# Patient Record
Sex: FEMALE | Race: WHITE | URBAN - METROPOLITAN AREA
[De-identification: names, ages, dates, MRNs, and addresses within clinical notes are randomized per-mention and may not be internally consistent; named-entity substitution may affect disease eponyms.]

---

## 2017-07-11 ENCOUNTER — HISTORICAL (OUTPATIENT)
Dept: OCCUPATIONAL THERAPY | Facility: HOSPITAL | Age: 57
End: 2017-07-11

## 2017-07-25 ENCOUNTER — HISTORICAL (OUTPATIENT)
Dept: OCCUPATIONAL THERAPY | Facility: HOSPITAL | Age: 57
End: 2017-07-25

## 2017-07-27 ENCOUNTER — HISTORICAL (OUTPATIENT)
Dept: OCCUPATIONAL THERAPY | Facility: HOSPITAL | Age: 57
End: 2017-07-27

## 2017-07-31 ENCOUNTER — HISTORICAL (OUTPATIENT)
Dept: OCCUPATIONAL THERAPY | Facility: HOSPITAL | Age: 57
End: 2017-07-31

## 2017-09-13 ENCOUNTER — HISTORICAL (OUTPATIENT)
Dept: ADMINISTRATIVE | Facility: HOSPITAL | Age: 57
End: 2017-09-13

## 2017-09-13 LAB
ABS NEUT (OLG): 3.73 X10(3)/MCL (ref 2.1–9.2)
ALBUMIN SERPL-MCNC: 4 GM/DL (ref 3.4–5)
ALBUMIN/GLOB SERPL: 1.2 {RATIO}
ALP SERPL-CCNC: 128 UNIT/L (ref 38–126)
ALT SERPL-CCNC: 19 UNIT/L (ref 12–78)
APTT PPP: 30.4 SECOND(S) (ref 20.6–36)
AST SERPL-CCNC: 15 UNIT/L (ref 15–37)
BASOPHILS # BLD AUTO: 0 X10(3)/MCL (ref 0–0.2)
BASOPHILS NFR BLD AUTO: 0 %
BILIRUB SERPL-MCNC: 0.4 MG/DL (ref 0.2–1)
BILIRUBIN DIRECT+TOT PNL SERPL-MCNC: 0.1 MG/DL (ref 0–0.2)
BILIRUBIN DIRECT+TOT PNL SERPL-MCNC: 0.3 MG/DL (ref 0–0.8)
BUN SERPL-MCNC: 13 MG/DL (ref 7–18)
CALCIUM SERPL-MCNC: 9.1 MG/DL (ref 8.5–10.1)
CHLORIDE SERPL-SCNC: 105 MMOL/L (ref 98–107)
CO2 SERPL-SCNC: 28 MMOL/L (ref 21–32)
CREAT SERPL-MCNC: 0.88 MG/DL (ref 0.55–1.02)
EOSINOPHIL # BLD AUTO: 0.1 X10(3)/MCL (ref 0–0.9)
EOSINOPHIL NFR BLD AUTO: 2 %
ERYTHROCYTE [DISTWIDTH] IN BLOOD BY AUTOMATED COUNT: 13.7 % (ref 11.5–17)
GLOBULIN SER-MCNC: 3.3 GM/DL (ref 2.4–3.5)
GLUCOSE SERPL-MCNC: 82 MG/DL (ref 74–106)
HCT VFR BLD AUTO: 41.1 % (ref 37–47)
HGB BLD-MCNC: 13.7 GM/DL (ref 12–16)
INR PPP: 1.06 (ref 0–1.27)
LYMPHOCYTES # BLD AUTO: 1.3 X10(3)/MCL (ref 0.6–4.6)
LYMPHOCYTES NFR BLD AUTO: 23 %
MCH RBC QN AUTO: 28.1 PG (ref 27–31)
MCHC RBC AUTO-ENTMCNC: 33.3 GM/DL (ref 33–36)
MCV RBC AUTO: 84.2 FL (ref 80–94)
MONOCYTES # BLD AUTO: 0.5 X10(3)/MCL (ref 0.1–1.3)
MONOCYTES NFR BLD AUTO: 9 %
NEUTROPHILS # BLD AUTO: 3.73 X10(3)/MCL (ref 1.4–7.9)
NEUTROPHILS NFR BLD AUTO: 65 %
PLATELET # BLD AUTO: 186 X10(3)/MCL (ref 130–400)
PMV BLD AUTO: 10.2 FL (ref 9.4–12.4)
POTASSIUM SERPL-SCNC: 3.9 MMOL/L (ref 3.5–5.1)
PROT SERPL-MCNC: 7.3 GM/DL (ref 6.4–8.2)
PROTHROMBIN TIME: 13.6 SECOND(S) (ref 12.1–14.2)
RBC # BLD AUTO: 4.88 X10(6)/MCL (ref 4.2–5.4)
SODIUM SERPL-SCNC: 140 MMOL/L (ref 136–145)
WBC # SPEC AUTO: 5.7 X10(3)/MCL (ref 4.5–11.5)

## 2020-05-12 ENCOUNTER — HOSPITAL ENCOUNTER (OUTPATIENT)
Dept: INTENSIVE CARE | Facility: HOSPITAL | Age: 60
End: 2020-05-13
Attending: INTERNAL MEDICINE | Admitting: INTERNAL MEDICINE

## 2022-04-09 ENCOUNTER — HISTORICAL (OUTPATIENT)
Dept: ADMINISTRATIVE | Facility: HOSPITAL | Age: 62
End: 2022-04-09

## 2022-04-25 VITALS
HEIGHT: 68 IN | DIASTOLIC BLOOD PRESSURE: 69 MMHG | WEIGHT: 155 LBS | BODY MASS INDEX: 23.49 KG/M2 | SYSTOLIC BLOOD PRESSURE: 125 MMHG
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
## 2022-05-02 NOTE — HISTORICAL [facility]
This is a historical note converted from Chandrika. Formatting and pictures may have been removed.  Please reference Chandrika for original formatting and attached multimedia. Chief Complaint  c/o left side weakness/numbness and blurry vision started yesterday. weakness and numbness resolved, still having blurry vision. hx TIAs, FAST negative  Reason for Consultation  TIA/CVA?work-up  History of Present Illness  60-year-old female?with past medical history of depression,?chronic back pain,?CVA (2014, no deficits),?presented to ED?on 5/12 with complaints of?blurred vision?and left-sided weakness/numbness.? She stated that symptoms began yesterday morning around?11 AM?while she was working in her garden.? She then began sweating profusely and?developed?left-sided weakness?with left-sided headache.? She took a 3-hour?nap, but continued to have blurry vision?after her nap.? She reports the blurry vision?as mirage-like or haziness in both eyes.? CT head showed no acute cortical infarct, hemorrhage, or mass lesion.? She will be admitted to hospitalist?and neurology was consulted?for?TIA/stroke.  Review of Systems  Except as documented, all other systems reviewed and are negative  Physical Exam  Vitals & Measurements  T:?37.0? ?C (Oral)? HR:?74(Peripheral)? HR:?74(Monitored)? RR:?21? BP:?152/97? SpO2:?97%? WT:?72.5?kg?  GENERAL: NAD, calm, cooperative, appropriate  HEART: RRR (SR on telemetry),? no LE edema  MENTAL STATUS: Alert,?oriented x4, follows commands reliably  SPEECH/LANGUAGE: Clear, fluent, coherent  CN:  perr, EOMI, VFF, gaze conjugate  No tactile or motor facial asymmetry  Motor: Muscle strength 5/5 BUE, BLE, No focal weakness  Cerebellar: No tremor or dysmetria  Sensory: Normal to tactile stim.  Gait: not observed  Memory: normal and thought process intact  SKIN: warm, dry, intact?  Assessment/Plan  Impression and plan  Blurry vision....?CVA work-up  Chronic pain syndrome  Depression  h/o CVA (2014)  ?  Stroke  workup in progress  -CTH:?No acute cortical infarct, hemorrhage, or mass lesion  -MRI, CTA,?ECHO, CUS, lipid panel, A1c: ordered  ?   ASA naïve, not on statin therapy at home  prn Hydralazine and Labetalol for SBP >220 or DBP >120  Bedrest and HOB flat for 24 hours  NPO until passes [facility]  PT/OT/ST to evaluate after 24 hour bedrest completed  ?  ?   Further recommendations may follow by MD.?   Problem List/Past Medical History  Ongoing  Cervicalgia(  Confirmed  )  Chronic back pain  Closed displaced fracture of left femoral neck  Depression  Hematuria(  Confirmed  )  Left hip pain  Lumbago(  Confirmed  )  Historical  Cholelithiasis NOS(  Confirmed  )  Procedure/Surgical History  Biopsy Gastrointestinal (09/13/2017)  Endoscopic Ultrasonography (Upper) (09/13/2017)  Esophagogastroduodenoscopy (09/13/2017)  Esophagogastroduodenoscopy, flexible, transoral; with biopsy, single or multiple (09/13/2017)  Esophagogastroduodenoscopy, flexible, transoral; with endoscopic ultrasound examination, including the esophagus, stomach, and either the duodenum or a surgically altered stomach where the jejunum is examined distal to the anastomosis (09/13/2017)  Excision of Stomach, Via Natural or Artificial Opening Endoscopic, Diagnostic (09/13/2017)  Inspection of Upper Intestinal Tract, Via Natural or Artificial Opening Endoscopic (09/13/2017)  Cystoscopy (None) (08/19/2014)  Cystourethroscopy (separate procedure). (08/19/2014)  Other cystoscopy (08/19/2014)  percutaneous pinning Lt. hip @ [facility] (06/18/2013)  back surg. (06/17/2013)  Deviated septum (2013)  COLONOSCOPY  EGD  History of hip surgery  Previous back surgery  Revision rhinoplasty   Medications  Inpatient  aspirin 325 mg oral tablet, 325 mg= 1 tab(s), Oral, Daily  atorvastatin, 40 mg= 1 tab(s), Oral, Daily  Cipro 500 mg oral tablet, 500 mg= 1 tab(s), Oral, Once  hydrALAZINE 20 mg/mL injectable solution, 10 mg= 0.5 mL, IV Push, q4hr, PRN  labetalol, 10 mg= 2 mL, IV  Push, q10min, PRN  lidocaine topical 2% gel with applicator, 5 belen(s), TOP, Once  NS (0.9% Sodium Chloride) Infusion 1,000 mL, 1000 mL, IV  Home  Neurontin 300 mg oral capsule, 300 mg= 1 cap(s), Oral, TID, 3 refills  oxyCODONE 20 mg oral tablet, 20 mg= 1 tab(s), Oral, q6hr,? ?Not taking: Last Dose Date/Time Unknown  oxyCODONE 30 mg oral tablet, 30 mg= 1 tab(s), Oral, q6hr  promethazine 25 mg oral tablet, 25 mg= 1 tab(s), Oral, Once a day (at bedtime)  Template Non-Formulary Med, See Instructions,? ?Investigating: Last Dose Date/Time Unknown  Zanaflex 6 mg oral capsule, 6 mg= 1 cap(s), Oral, QID, 3 refills,? ?Investigating: Last Dose Date/Time Unknown  Zoloft 100 mg oral tablet, 100 mg= 1 tab(s), Oral, Daily  Allergies  No Known Allergies  No Known Medication Allergies  Social History  Alcohol - Denies Alcohol Use, 08/19/2014  Never, 11/17/2016  Never, 08/09/2016  Employment/School  UNABLE TO WORK, Work/School description: UNABLE TO WORK. Previous employment/school: Completed 11th grade. Operates hazardous equipment: No., 01/24/2017  Exercise - Does not exercise, 06/30/2015  Self assessment: Poor condition., 06/30/2015  Home/Environment  Lives with Alone. Living situation: Home/Independent. Home equipment: Walker/Cane. Alcohol abuse in household: No. Substance abuse in household: No. Smoker in household: No. Injuries/Abuse/Neglect in household: No. Feels unsafe at home: No. Safe place to go: Yes. Family/Friends available for support: Yes. Concern for family members at home: No. Major illness in household: No. Financial concerns: No. TV/Computer concerns: No. Risks in environment: Pets/Animal exposure., 01/24/2017  Nutrition/Health  Low Salt, Wants to lose weight: No. Sleeping concerns: Yes. Feels highly stressed: Yes., 08/09/2016  Substance Use - Denies Substance Abuse, 08/19/2014  Never, 11/17/2016  Never, 08/09/2016  Tobacco - Denies Tobacco Use, 08/19/2014  Never smoker, 11/16/2016  Never smoker,  08/09/2016  Family History  Diabetes mellitus type 2: Mother and Mother.  Primary malignant neoplasm of colon: Mother and Father.  Lab Results  Labs Last 24 Hours?  ?Chemistry? Hematology/Coagulation?   Sodium Lvl: 143 mmol/L (05/12/20 14:05:00) PT: 12.5 second(s) (05/12/20 14:05:00)   Potassium Lvl: 4.1 mmol/L (05/12/20 14:05:00) INR: 1 (05/12/20 14:05:00)   Chloride: 107 mmol/L (05/12/20 14:05:00) PTT: 29.4 second(s) (05/12/20 14:05:00)   CO2: 26 mmol/L (05/12/20 14:05:00) WBC: 5.3 x10(3)/mcL (05/12/20 14:05:00)   Calcium Lvl: 9 mg/dL (05/12/20 14:05:00) RBC: 4.63 x10(6)/mcL (05/12/20 14:05:00)   Glucose Lvl:?79 mg/dL?Low (05/12/20 14:05:00) Hgb: 13.1 gm/dL (05/12/20 14:05:00)   BUN: 10.3 mg/dL (05/12/20 14:05:00) Hct: 42.3 % (05/12/20 14:05:00)   Creatinine: 0.84 mg/dL (05/12/20 14:05:00) Platelet: 179 x10(3)/mcL (05/12/20 14:05:00)   eGFR-AA: >60 (05/12/20 14:05:00) MCV: 91.4 fL (05/12/20 14:05:00)   eGFR-MIREYA: >60 (05/12/20 14:05:00) MCH: 28.3 pg (05/12/20 14:05:00)   Bili Total: 0.3 mg/dL (05/12/20 14:05:00) MCHC:?31 gm/dL?Low (05/12/20 14:05:00)   Bili Direct: 0.1 mg/dL (05/12/20 14:05:00) RDW: 12.4 % (05/12/20 14:05:00)   Bili Indirect: 0.2 mg/dL (05/12/20 14:05:00) MPV: 10.2 fL (05/12/20 14:05:00)   AST: 24 unit/L (05/12/20 14:05:00) Abs Neut: 2.88 x10(3)/mcL (05/12/20 14:05:00)   ALT: 14 unit/L (05/12/20 14:05:00) Neutro Auto: 54 % (05/12/20 14:05:00)   Alk Phos: 61 unit/L (05/12/20 14:05:00) Lymph Auto: 32 % (05/12/20 14:05:00)   Total Protein: 6.9 gm/dL (05/12/20 14:05:00) Mono Auto: 7 % (05/12/20 14:05:00)   Albumin Lvl: 3.9 gm/dL (05/12/20 14:05:00) Eos Auto: 6 % (05/12/20 14:05:00)   Globulin: 3 gm/dL (05/12/20 14:05:00) Abs Eos: 0.3 x10(3)/mcL (05/12/20 14:05:00)   A/G Ratio: 1.3 ratio (05/12/20 14:05:00) Basophil Auto: 1 % (05/12/20 14:05:00)   Total CK:?177 U/L?High (05/12/20 14:05:00) Abs Neutro: 2.88 x10(3)/mcL (05/12/20 14:05:00)   TSH: 2.7294 uIU/mL (05/12/20 14:05:00) Abs Lymph: 1.7 x10(3)/mcL  (05/12/20 14:05:00)    Abs Mono: 0.4 x10(3)/mcL (05/12/20 14:05:00)    Abs Baso: 0 x10(3)/mcL (05/12/20 14:05:00)   Diagnostic Results  (05/12/2020 14:28 CDT CT Head W/O Contrast)  Reason For Exam  TIA, initial exam;Other (please specify)  ?  Radiology Report  Findings:  ?  There is no acute cortical infarct, hemorrhage or mass lesion. Minimal  patchy hypoattenuation the cerebral white matter is nonspecific but  most commonly associated with chronic small vessel ischemic changes.  The ventricles are not significantly enlarged.  ?  Visualized paranasal sinuses and mastoid air cells are clear.  ?  Impression:  No acute cortical infarct, hemorrhage or mass lesion.      pt seen and examined in conjunction with NP  dw ICU MD F to F  neuro exam comments/ see also above NP note:  neuro intact  ?   imaging: images and report(s) reviewed  CTh ok  mri 2014 minimal chr isch ch/wmhs  ?   Assessment/Plan/Recs:  as above with the following addn comments (if any):  1. await MRI  dw MRI tech  aim to reassess tomorrow  ?  ?

## 2022-05-02 NOTE — HISTORICAL [facility]
This is a historical note converted from Cerlatricia. Formatting and pictures may have been removed.  Please reference Cerlatricia for original formatting and attached multimedia. Chief Complaint  Left-sided weakness?and numbness  History of Present Illness  PCP: Lucila Mena MD  CODE STATUS: Full  ?  Ms. Feliz is a 60-year-old female with a history of chronic arthralgia back pain opioid dependent as well as CVA without residual deficits.? She presented emergency department due to sudden onset of left-sided weakness and paresthesias with associated headache and blurred vision.? Patient reports that yesterday evening while she was in her garden she suddenly became weak on her left side and noted some numbness/tingling.? She took a nap and later when she woke up her symptoms resolved.? However, she later developed headache and blurriness in her vision.? On arrival CT head did not show any acute intracranial abnormalities.? She had no focal deficits on initial exam.? Laboratory work-up was unremarkable.? Hospital medicine was consulted for admission for CVA work-up.  Review of Systems  Except as documented all systems reviewed and negative  Physical Exam  Vitals & Measurements  T:?37.0? ?C (Oral)? HR:?64(Peripheral)? HR:?64(Monitored)? RR:?19? BP:?152/97? SpO2:?99%? WT:?72.5?kg?  General:?NAD, nontoxic appearing  Eye: PERRLA, clear conjunctiva  HENT:?moist mucus membranes, normocephalic  Neck: full range of motion, no lymphadenopathy  Respiratory:?clear to auscultation bilaterally, no wheezes rales or rhonchi  Cardiovascular:?regular rate and rhythm without murmurs, gallops or rubs, no carotid bruits  Gastrointestinal:?soft, non-tender, non-distended, active bowel sounds  Genitourinary: no CVA tenderness to palpation  Musculoskeletal:?full range of motion of all extremities  Integumentary: no rashes or skin lesions present  Neurologic: cranial nerves intact, 5 out of 5?upper extremity strength?bilaterally,?4-5?left lower  extremity, no dysarthria or aphasia, no visual field deficits,?no sensory deficits  ?   Labs?(Last four charted values)  WBC ?????5.3???(MAY 12)  Hgb ?????13.1???(MAY 12)  Hct ?????42.3???(MAY 12)  Plt ?????179???(MAY 12)  Na ?????143???(MAY 12)  K ?????4.1???(MAY 12)  CO2 ?????26???(MAY 12)  Cl ?????107???(MAY 12)  Cr ?????0.84???(MAY 12)  BUN ?????10.3???(MAY 12)  Glucose Random ???????L?79???(MAY 12)  PT ?????12.5???(MAY 12)  INR ?????1.0???(MAY 12)  PTT ?????29.4???(MAY 12)  ?   Accession:?[id number]  Order:?CT Head W/O Contrast  Report Dt/Tm:?05/12/2020 14:30  Report:?  ?  Clinical History:  Left-sided weakness and numbness  ?  Reference:  None Available.  ?  Technique:  CT imaging of the head performed from the skull base to the vertex  without intravenous contrast.  mGycm. Automatic exposure  control, adjustment of mA/kV or iterative reconstruction technique was  used to reduce radiation.  ?  Findings:  ?  There is no acute cortical infarct, hemorrhage or mass lesion. Minimal  patchy hypoattenuation the cerebral white matter is nonspecific but  most commonly associated with chronic small vessel ischemic changes.  The ventricles are not significantly enlarged.  ?  Visualized paranasal sinuses and mastoid air cells are clear.  ?  Impression:  No acute cortical infarct, hemorrhage or mass lesion.  Assessment/Plan  Transient ischemic attack  Rule out CVA  Blurred vision  Left-sided weakness/paresthesias-resolved  History of CVA without residual deficits  Chronic back pain  Opioid dependence  Depression  ?  Plan:  ?  Was not a candidate for TPA  CT?head without any acute abnormalities  We will proceed with CVA protocol  MRI brain,?CTA head neck,?carotid ultrasound, echocardiogram  Start full dose aspirin?and high intensity statin  Allow for permissive hypertension  PT/OT/SLP eval  A1c lipid panel  Consult to neurology  ?  I, Dimitris Pop PA-C, have seen and discussed this patients case with ?Pop  MD  Please see addendum section and the rest of the note for further information on patient assessment and treatment  ?   i li Perezd care of this patient at 5.10 pm  For this patient encounter I reviewed NPs documentation, treatment plan and medical decision making. ?I had a face-to-face time with this pt  please see addendum below  60-year-old female presented to the ER with complaints of left-sided weakness numbness and blurred vision apparently patient was working outside in her garden and when she came inside she started sweating profusely and noticed left-sided weakness and numbness. ?She took a nap and then she worked up still had blurred vision today everything is almost back to normal. ?CT of the head without contrast ordered and negative  ?  General alert awake oriented  Chest clear to auscultate  Abdomen soft nontender  neuro : sensory intact  motor :5/5 in b/l upper extremity and  4-5 /5 in LLE  ?  Rule out CVA  Chronic pain syndrome  Depression  ?  We will initiate stroke protocol  MRI carotid ultrasound 2D echo  PT OT ST  We will start on aspirin and statin if patient passes swallowing  ?pt is aspirin naive  neuro consult  npo until pt passes swallowing  ?  ?  prophylaxis : scd   Problem List/Past Medical History  Ongoing  Cervicalgia(  Confirmed  )  Chronic back pain  Closed displaced fracture of left femoral neck  Depression  Hematuria(  Confirmed  )  Left hip pain  Lumbago(  Confirmed  )  Historical  Cholelithiasis NOS(  Confirmed  )  Procedure/Surgical History  Biopsy Gastrointestinal (09/13/2017)  Endoscopic Ultrasonography (Upper) (09/13/2017)  Esophagogastroduodenoscopy (09/13/2017)  Esophagogastroduodenoscopy, flexible, transoral; with biopsy, single or multiple (09/13/2017)  Esophagogastroduodenoscopy, flexible, transoral; with endoscopic ultrasound examination, including the esophagus, stomach, and either the duodenum or a surgically altered stomach where the jejunum is  examined distal to the anastomosis (09/13/2017)  Excision of Stomach, Via Natural or Artificial Opening Endoscopic, Diagnostic (09/13/2017)  Inspection of Upper Intestinal Tract, Via Natural or Artificial Opening Endoscopic (09/13/2017)  Cystoscopy (None) (08/19/2014)  Cystourethroscopy (separate procedure). (08/19/2014)  Other cystoscopy (08/19/2014)  percutaneous pinning Lt. hip @ [facility] (06/18/2013)  back surg. (06/17/2013)  Deviated septum (2013)  COLONOSCOPY  EGD  History of hip surgery  Previous back surgery  Revision rhinoplasty   Medications  Inpatient  aspirin 325 mg oral tablet, 325 mg= 1 tab(s), Oral, Daily  atorvastatin, 40 mg= 1 tab(s), Oral, Daily  Cipro 500 mg oral tablet, 500 mg= 1 tab(s), Oral, Once  hydrALAZINE 20 mg/mL injectable solution, 10 mg= 0.5 mL, IV Push, q4hr, PRN  labetalol, 10 mg= 2 mL, IV Push, q10min, PRN  lidocaine topical 2% gel with applicator, 5 belen(s), TOP, Once  NS (0.9% Sodium Chloride) Infusion 1,000 mL, 1000 mL, IV  Home  Neurontin 300 mg oral capsule, 300 mg= 1 cap(s), Oral, TID, 3 refills  oxyCODONE 20 mg oral tablet, 20 mg= 1 tab(s), Oral, q6hr,? ?Not taking: Last Dose Date/Time Unknown  oxyCODONE 30 mg oral tablet, 30 mg= 1 tab(s), Oral, q6hr  promethazine 25 mg oral tablet, 25 mg= 1 tab(s), Oral, Once a day (at bedtime)  Template Non-Formulary Med, See Instructions,? ?Investigating: Last Dose Date/Time Unknown  Zanaflex 6 mg oral capsule, 6 mg= 1 cap(s), Oral, QID, 3 refills,? ?Investigating: Last Dose Date/Time Unknown  Zoloft 100 mg oral tablet, 100 mg= 1 tab(s), Oral, Daily  Allergies  No Known Allergies  No Known Medication Allergies  Social History  Alcohol - Denies Alcohol Use, 08/19/2014  Never, 11/17/2016  Never, 08/09/2016  Employment/School  UNABLE TO WORK, Work/School description: UNABLE TO WORK. Previous employment/school: Completed 11th grade. Operates hazardous equipment: No., 01/24/2017  Exercise - Does not exercise, 06/30/2015  Self assessment: Poor  condition., 06/30/2015  Home/Environment  Lives with Alone. Living situation: Home/Independent. Home equipment: Walker/Cane. Alcohol abuse in household: No. Substance abuse in household: No. Smoker in household: No. Injuries/Abuse/Neglect in household: No. Feels unsafe at home: No. Safe place to go: Yes. Family/Friends available for support: Yes. Concern for family members at home: No. Major illness in household: No. Financial concerns: No. TV/Computer concerns: No. Risks in environment: Pets/Animal exposure., 01/24/2017  Nutrition/Health  Low Salt, Wants to lose weight: No. Sleeping concerns: Yes. Feels highly stressed: Yes., 08/09/2016  Substance Use - Denies Substance Abuse, 08/19/2014  Never, 11/17/2016  Never, 08/09/2016  Tobacco - Denies Tobacco Use, 08/19/2014  Never smoker, 11/16/2016  Never smoker, 08/09/2016  Family History  Diabetes mellitus type 2: Mother and Mother.  Primary malignant neoplasm of colon: Mother and Father.

## 2022-05-02 NOTE — HISTORICAL [facility]
This is a historical note converted from Cerlatricia. Formatting and pictures may have been removed.  Please reference Cerner for original formatting and attached multimedia. Admit and Discharge Dates  Admit Date: 05/12/2020  Discharge Date: 05/13/2020  Physicians  Attending Physician - Pop TRAN, Ita  Admitting Physician - Pop TRAN, Ita  Primary Care Physician - Rajesh TRAN, Lucila SELF  Discharge Diagnosis  Transient ischemic attack  Suspected fibromuscular dysplasia  Chronic pain syndrome  Opioid dependence  Surgical Procedures  No procedures recorded for this visit.  Immunizations  No immunizations recorded for this visit.  Admission Information  ?  Ms. Feliz is a 60-year-old female with a history of chronic arthralgia back pain opioid dependent as well as CVA without residual deficits.? She presented emergency department due to sudden onset of left-sided weakness and paresthesias with associated headache and blurred vision.? Patient reports that yesterday evening while she was in her garden she suddenly became weak on her left side and noted some numbness/tingling.? She took a nap and later when she woke up her symptoms resolved.? However, she later developed headache and blurriness in her vision.? On arrival CT head did not show any acute intracranial abnormalities.? She had no focal deficits on initial exam.? Laboratory work-up was unremarkable.? Hospital medicine was consulted for admission for CVA work-up.  Hospital Course  Essentially this lady had been admitted for stroke work-up.? On CTA of the head and neck she was noted to have no large vessel occlusion or flow-limiting stenosis but there was a beaded appearance of the distal right cervical ICA suggestive of fibromuscular dysplasia.? She does not carry a previous diagnosis of this.? The other differential for this would be possible vasculitis however any other findings to suggest ongoing active vasculitis.? TIAs are 1 of the common findings with  fibromuscular dysplasia.? She will need this to be followed up with her primary care physician she is aware of this.? She does not have a history of hypertension or elevated blood pressure chronically to suggest any renal artery involvement.? She does have a significantly elevated LDL which will need to be addressed with statin therapy.? Otherwise as far as her symptoms go they have now?resolved. ?She has been cleared for discharge from neurology standpoint.? They recommend aspirin and statin therapy.  Time Spent on discharge  40 minutes  Objective  Vitals & Measurements  T:?36.8? ?C (Oral)? TMIN:?36.6? ?C (Oral)? TMAX:?37.0? ?C (Oral)? HR:?57(Peripheral)? HR:?58(Monitored)? RR:?18? BP:?136/79? SpO2:?94%? WT:?72.5?kg? BMI:?24.3?  Physical Exam  General: In no acute distress, afebrile  Chest: Clear to auscultation bilaterally  Heart: S1, S2, no appreciable murmur  Abdomen: Soft, nontender, BS +  MSK: Warm, no lower extremity edema, no clubbing or cyanosis  Neurologic: Alert and oriented x4, moving all extremities with good strength  Patient Discharge Condition  Improved and stable  Discharge Disposition  Home   Discharge Medication Reconciliation  Prescribed  aspirin (aspirin 81 mg oral tablet)?81 mg, Oral, Daily  atorvastatin (atorvastatin 40 mg oral tablet)?40 mg, Oral, Daily  Continue  Template Non-Formulary Med?See Instructions  gabapentin (Neurontin 300 mg oral capsule)?300 mg, Oral, TID  oxyCODONE (oxyCODONE 30 mg oral tablet)?30 mg, Oral, q6hr  promethazine (promethazine 25 mg oral tablet)?25 mg, Oral, Once a day (at bedtime)  sertraline (Zoloft 100 mg oral tablet)?100 mg, Oral, Daily  tiZANidine (Zanaflex 6 mg oral capsule)?6 mg, Oral, QID  Discontinue  oxyCODONE (oxyCODONE 20 mg oral tablet)?20 mg, Oral, q6hr  Education and Orders Provided  Stroke Prevention  Ischemic Stroke  Fall Prevention and Home Safety, Easy-to-Read (Custom)  Cholesterol, Easy-to-Read  Hypertension, Easy-to-Read  Discharge - 05/13/20  14:33:00 CDT, Home, Give all scheduled vaccinations prior to discharge.?  Discharge Activity - Activity as Tolerated?  Discharge Diet - Cardiac?  Follow up  Lucila Mena MD, within 1 to 2 weeks  ????  Follow up for possible Fibromuscluar Dysplasia/ TIAThe office will call you with your appointment

## 2025-03-14 DIAGNOSIS — N64.59 INVERTED NIPPLE: Primary | ICD-10-CM

## 2025-03-14 DIAGNOSIS — M79.89 LEFT AXILLARY SWELLING: ICD-10-CM

## 2025-04-15 DIAGNOSIS — M79.89 LEFT AXILLARY SWELLING: ICD-10-CM

## 2025-04-15 DIAGNOSIS — N64.59 INVERTED NIPPLE: Primary | ICD-10-CM

## 2025-04-29 ENCOUNTER — HOSPITAL ENCOUNTER (OUTPATIENT)
Dept: RADIOLOGY | Facility: HOSPITAL | Age: 65
Discharge: HOME | End: 2025-04-29
Attending: SURGERY
Payer: MEDICARE

## 2025-04-29 DIAGNOSIS — M79.89 LEFT AXILLARY SWELLING: ICD-10-CM

## 2025-04-29 DIAGNOSIS — N64.59 INVERTED NIPPLE: ICD-10-CM

## 2025-04-29 PROCEDURE — 77065 DX MAMMO INCL CAD UNI: CPT | Mod: TC,LT

## 2025-04-29 PROCEDURE — 76641 ULTRASOUND BREAST COMPLETE: CPT | Mod: 26,LT,, | Performed by: RADIOLOGY

## 2025-04-29 PROCEDURE — 77061 BREAST TOMOSYNTHESIS UNI: CPT | Mod: 26,LT,, | Performed by: RADIOLOGY

## 2025-04-29 PROCEDURE — 76641 ULTRASOUND BREAST COMPLETE: CPT | Mod: TC,LT

## 2025-04-29 PROCEDURE — 77065 DX MAMMO INCL CAD UNI: CPT | Mod: 26,LT,, | Performed by: RADIOLOGY

## 2025-04-30 ENCOUNTER — TELEPHONE (OUTPATIENT)
Dept: RADIOLOGY | Facility: HOSPITAL | Age: 65
End: 2025-04-30
Payer: MEDICARE

## 2025-04-30 RX ORDER — OXYCODONE HYDROCHLORIDE 30 MG/1
5 TABLET ORAL EVERY 6 HOURS PRN
COMMUNITY

## 2025-04-30 RX ORDER — LOSARTAN POTASSIUM 25 MG/1
25 TABLET ORAL DAILY
COMMUNITY

## 2025-04-30 RX ORDER — METRONIDAZOLE 10 MG/G
GEL TOPICAL DAILY
COMMUNITY

## 2025-04-30 RX ORDER — ERGOCALCIFEROL 1.25 MG/1
50000 CAPSULE ORAL
COMMUNITY

## 2025-04-30 NOTE — CARE UPDATE
Breast biopsy scheduled with patient at [facility] on 5/14/25. Instructions given. Patient verbalized understanding.

## 2025-05-14 ENCOUNTER — HOSPITAL ENCOUNTER (OUTPATIENT)
Dept: RADIOLOGY | Facility: HOSPITAL | Age: 65
Discharge: HOME | End: 2025-05-14
Attending: SURGERY
Payer: MEDICARE

## 2025-05-14 DIAGNOSIS — N63.42 SUBAREOLAR MASS OF LEFT BREAST: ICD-10-CM

## 2025-05-14 PROCEDURE — 19083 BX BREAST 1ST LESION US IMAG: CPT | Mod: LT

## 2025-05-14 PROCEDURE — 88305 TISSUE EXAM BY PATHOLOGIST: CPT | Performed by: SURGERY

## 2025-05-14 PROCEDURE — 77065 DX MAMMO INCL CAD UNI: CPT | Mod: TC,LT

## 2025-05-15 ENCOUNTER — RESULTS FOLLOW-UP (OUTPATIENT)
Dept: RADIOLOGY | Facility: HOSPITAL | Age: 65
End: 2025-05-15

## 2025-05-15 LAB — PSYCHE PATHOLOGY RESULT: NORMAL

## 2025-05-29 ENCOUNTER — OFFICE VISIT (OUTPATIENT)
Dept: SURGERY | Facility: CLINIC | Age: 65
End: 2025-05-29
Payer: MEDICARE

## 2025-05-29 VITALS
HEIGHT: 68 IN | WEIGHT: 162 LBS | DIASTOLIC BLOOD PRESSURE: 81 MMHG | HEART RATE: 72 BPM | SYSTOLIC BLOOD PRESSURE: 144 MMHG | BODY MASS INDEX: 24.55 KG/M2 | TEMPERATURE: 98 F | RESPIRATION RATE: 20 BRPM | OXYGEN SATURATION: 97 %

## 2025-05-29 DIAGNOSIS — N64.4 BREAST PAIN IN FEMALE: Primary | ICD-10-CM

## 2025-05-29 DIAGNOSIS — M79.89 LEFT AXILLARY SWELLING: ICD-10-CM

## 2025-05-29 DIAGNOSIS — Z91.89 AT HIGH RISK FOR BREAST CANCER: ICD-10-CM

## 2025-05-29 DIAGNOSIS — N64.59 INVERTED NIPPLE: ICD-10-CM

## 2025-05-29 PROCEDURE — 99999 PR PBB SHADOW E&M-EST. PATIENT-LVL V: CPT | Mod: PBBFAC,,, | Performed by: SURGERY

## 2025-05-29 RX ORDER — PREDNISONE 20 MG/1
20 TABLET ORAL
COMMUNITY
Start: 2025-05-21

## 2025-05-29 RX ORDER — AMOXICILLIN 875 MG/1
875 TABLET, COATED ORAL 2 TIMES DAILY
COMMUNITY
Start: 2025-05-21

## 2025-05-29 RX ORDER — GABAPENTIN 300 MG/1
CAPSULE ORAL
COMMUNITY

## 2025-05-29 RX ORDER — LEVOTHYROXINE SODIUM 50 UG/1
50 TABLET ORAL EVERY MORNING
COMMUNITY

## 2025-05-29 RX ORDER — SERTRALINE HYDROCHLORIDE 100 MG/1
100 TABLET, FILM COATED ORAL
COMMUNITY

## 2025-05-29 RX ORDER — AMITRIPTYLINE HYDROCHLORIDE 75 MG/1
75 TABLET ORAL
COMMUNITY

## 2025-05-29 RX ORDER — ATORVASTATIN CALCIUM 40 MG/1
1 TABLET, FILM COATED ORAL EVERY MORNING
COMMUNITY
Start: 2024-11-29

## 2025-05-29 RX ORDER — METRONIDAZOLE 10 MG/G
GEL TOPICAL
COMMUNITY
Start: 2025-03-13

## 2025-05-31 PROBLEM — N64.4 BREAST PAIN IN FEMALE: Status: ACTIVE | Noted: 2025-05-31

## 2025-05-31 NOTE — PROGRESS NOTES
[facility] Breast Surg  Breast Surgical Oncology  New Patient Office Visit - H&P      Referring Provider: Dr. Lucila Mena   PCP: No primary care provider on file.     Patient Care Team:  Lucila Mena MD (Internal Medicine)  Wendy Peña MD as Consulting Physician (General Surgery)      Chief Complaint:   Chief Complaint   Patient presents with    Breast Pain     Patient reports hardness in left areola x 2 weeks ago, tender to touch, mild swelling, denies any redness, nipple discharge        Subjective:       HPI:  Lucila Feliz is a 65 y.o. female who presents on 5/29/2025 for evaluation of new self reported findings of left axillary fullness and left nipple inversion. She also reports cyclic bilateral breast pain that has been present for at least 3 months, diffuse. Upon further discussion of left nipple inversion, patient reports this is something she has noticed comes and goes, with some days being very noticeable to her and other days the nipple is not inverted.     A detailed patient history was obtained and reviewed. She currently denies any breast issues including rashes, redness, nipple discharge, or new lumps/masses.    MG breast density: heterogenously dense, category C    Imaging:   Diagnostic MMG and complete US of L breast performed 4/29/25:  UNILATERAL LEFT DIGITAL DIAGNOSTIC MAMMOGRAM 3D/2D WITH CAD: 4/29/2025  HISTORY: 65-year-old female presents for evaluation of:   - left axillary swelling for 2 months  - left nipple inversion for almost 1 year  - diffuse left breast pain for 1 month  - palpable lump in the left breast upper outer quadrant for 1 month     Family history of breast cancer (sister age 50).       COMPARISONS: Comparison is made to exams dated:  8/30/2024 mammogram, 5/16/2022 mammogram, 11/19/2019 mammogram - [facility], 9/12/2016 mammogram - [facility], 3/19/2014 mammogram, and 10/29/2012 mammogram - [facility].       TECHNIQUE: Digital mammography views were performed of the left breast with tomosynthesis, including spot compression views. Current study was evaluated with a Computer Aided Detection (CAD) system. Complete ultrasound evaluation of the left breast four quadrants and retroareolar region and the left axilla was subsequently performed, and static and cine grayscale images were submitted to PACS for review.      BREAST COMPOSITION: The left breast is heterogeneously dense, which may obscure small masses.       FINDINGS:      LEFT DIAGNOSTIC MAMMOGRAM     No significant masses, calcifications, or other findings seen in the left breast.       No significant mammographic abnormality is evident in the area of palpable concern localized by the patient to the 2:00 left breast (denoted by a triangular skin marker).     LEFT BREAST ULTRASOUND     Complete ultrasound evaluation was performed of the left breast and axilla, with attention to the areas of clinical concern.     There are multiple dilated ducts in the left breast subareolar region.  Within several of these dilated ducts in the subareolar left breast, there is hypoechoic/isoechoic intraductal debris versus mass, spanning at least 3.5 cm.     No other significant sonographic abnormality is evident in the left breast.  No suspicious sonographic abnormality is evident in the area of palpable concern localized by the patient to the 2:00 left breast 5 cm from the nipple.  Normal-appearing fibroglandular and fibrofatty tissue seen in the area of palpable concern.     Ultrasound evaluation of the left axilla demonstrates no significant left axillary adenopathy.  No sonographic correlate is identified for the patient's left axillary swelling.        IMPRESSION: SUSPICIOUS OF MALIGNANCY  1.  Left breast subareolar region dilated ducts with hypoechoic/isoechoic intraductal debris versus mass, spanning at least 3.5 cm, are suspicious for  malignancy.    2.  No significant mammographic or sonographic abnormality in the area of palpable concern in the 2:00 left breast 5 cm from the nipple.    3.  No mammographic or sonographic correlate for the patient's diffuse left breast pain or left axillary swelling.     RECOMMENDATIONS:   Ultrasound-guided core needle biopsy of the left breast subareolar region dilated ducts with hypoechoic intraductal debris versus mass is recommended.    US guided CNB of left subareolar area performed 25    Pathology:  US guided CNB of L subareolar mass:     FINAL DIAGNOSIS      BREAST, LEFT:      FOCAL DUCT CYST WITH MILD PERICYSTIC CHRONIC INFLAMMATION.      STROMAL FIBROSIS, CYSTIC DILATATION OF DUCTS, SCLEROSING ADENOSIS, APOCRINE   METAPLASIA AND MILD DUCTAL EPITHELIAL HYPERPLASIA OF THE USUAL TYPE.      NO EVIDENCE OF MALIGNANCY.     Radiology note following results of CNB:    Pathology results are benign and concordant with mammography and ultrasound findings.          RECOMMENDATIONS:  Recommend clinical follow-up for the multiple left breast areas of concern for which the patient presented for diagnostic breast imaging on 2025.    Return to annual screening mammography with tomosynthesis is recommended unless clinical signs or symptoms warrant earlier evaluation. Next routine annual screening mammogram is due .       OB/GYN History:  Age at Menarche Onset: 13  Menopausal Status: postmenopausal, LMP: No LMP recorded. Patient is postmenopausal.  Hysterectomy/Oophorectomy: menopause, at age 48  Hormonal birth control (duration): No none.   Pregnancy History:   Age at first live birth: 17  Hormone Replacement Therapy: No, none  Patient unknown breast feed.  Patient denies nipple discharge.   Patient has to previous breast biopsy.   Patient denies to a personal history of breast cancer.    Other Relevant History:  Prior thoracic RT: none  Genetic testing: none  Ashkenazi [employer]ism descent:  "No    Family History:  Family History   Problem Relation Name Age of Onset    Colon cancer Mother Luana Gil    Stomach cancer Father Luis Angel 80    Lung cancer Sister Clift[place]ia 68    Cervical cancer Sister Prema 33    Solid tumor Brother Asael 13        Brain Tumor        Past History:  Past Medical History:   Diagnosis Date    Breast pain in female 05/31/2025    Depression     Encounter for blood transfusion     Hyperlipidemia     Hypertension     Neuromuscular disorder     Thyroid disease         Past Surgical History:   Procedure Laterality Date    CHOLECYSTECTOMY      HIP SURGERY Left     Screws in Left Hip ==SX 2013    JOINT REPLACEMENT Left     Left Hip ==2013    SPINE SURGERY          Social History[1]     Body mass index is 24.63 kg/m².     Allergy/Medications:   Review of patient's allergies indicates:  No Known Allergies     Current Medications[2]       Review of Systems:  Review of Systems   Constitutional:  Negative for chills, fever and weight loss.   Cardiovascular:  Negative for chest pain.   Gastrointestinal:  Negative for abdominal pain, nausea and vomiting.   Skin:  Negative for itching and rash.          Objective:     Vitals:  Blood pressure (!) 144/81, pulse 72, temperature 98.2 °F (36.8 °C), temperature source Oral, resp. rate 20, height 5' 8" (1.727 m), weight 73.5 kg (162 lb), SpO2 97%.      Physical Exam:  General: The patient is awake, alert and oriented times three. The patient is well nourished and in no acute distress.   Neck: There is no evidence of palpable cervical, supraclavicular or axillary adenopathy. The neck is supple. The thyroid is not enlarged.   Musculoskeletal: The patient has a normal range of motion of her bilateral upper extremities.   Chest: Examination of the chest wall fails to reveal any obvious abnormalities. The lungs are clear to auscultation bilaterally without rales, rhonchi, or wheezing.   Cardiovascular: The heart has a regular rate and rhythm without murmurs, " gallops or rubs.  Breast:  Right: Examination of right breast fails to reveal any dominant masses or areas of significant focal nodularity. The nipple is everted without evidence of discharge. There is no skin dimpling with movement of the pectoralis. There are no significant skin changes overlying the breast.   Left: Examination of the left breast reveals significant ecchymosis and palpable hematoma s/p CNB previously performed, nipple is everted without evidence of discharge. There is no skin dimpling with movement of the pectoralis. There are no significant skin changes overlying the breast. I do not appreciate any left axillary swelling or enlargment; no LAD  Abdomen: The abdomen is soft, flat, nontender and nondistended with no palpable masses or organomegaly.  Integumentary: no rashes or skin lesions present  Neurologic: cranial nerves intact, no signs of peripheral neurological deficit, motor/sensory function intact    Assessment and Discussion:     64yo F with complaints of L nipple inversion, left axillary swelling and intermittent bilateral breast pain. I was unable to appreciate any swelling or enlargement of the left axilla and her left nipple is everted, matching the nipple of her right breast.   She does however have ecchymosis and hematoma s/p cnb of left subareolar lesion which resulted in benign pathology.       Plan:     Return to clinic in 2 weeks to monitor resolution of left breast hematoma and check in on breast pain  Referral to genetic counselor as patient is interested and significantly anxious given large number of family members diagnosed with various cancers    All of questions were answered    Wendy Peña MD  Breast Surgery     OFFICE VISIT CODING:    Non-face-to-face time included:  Yes Preparing to see the patient such as reviewing the patient record  Yes Obtaining and reviewing separately obtained history  Yes Independently interpreting results  Yes Documenting clinical information  in electronic health record  No Ordering appropriate medications  No Ordering appropriate tests  Yes Ordering appropriate procedures (including follow-up)  Yes Referring and communicating with other health care professionals (not separately reported)  Yes Care Coordination (not separately reported)    Face-to-face time included:  Yes Performing a medically necessary appropriate history, examination, and/or evaluation  Yes Communicating results to the patient/family/caregiver  Yes Counseling and educating the patient/family/caregiver  Yes Answering patient/family/caregiver questions    Total Time: 35 minutes    Total time includes both face-to-face and non-face-to-face time personally spent by myself on the day of the visit.           [1]   Social History  Socioeconomic History    Marital status:    Tobacco Use    Smoking status: Never     Passive exposure: Never    Smokeless tobacco: Never   Substance and Sexual Activity    Alcohol use: Never    Drug use: Never     Social Drivers of Health     Financial Resource Strain: Low Risk  (11/12/2024)    [facility]    Overall Financial Resource Strain (CARDIA)     Difficulty of Paying Living Expenses: Not hard at all   Food Insecurity: No Food Insecurity (11/12/2024)    Received from [facility] and Affiliates    Hunger Vital Sign     Worried About Running Out of Food in the Last Year: Never true     Ran Out of Food in the Last Year: Never true   Transportation Needs: No Transportation Needs (11/12/2024)    [facility]    PRAPARE - Transportation     Lack of Transportation (Medical): No     Lack of Transportation (Non-Medical): No   Physical Activity: Sufficiently Active (11/12/2024)    [facility] and Its Subsidiaries and Affiliates    Exercise Vital Sign     Days of Exercise per Week: 7 days     Minutes of Exercise per Session: 30 min   Stress: Stress Concern Present (11/12/2024)    Received from [facility] and Its Subsidiaries and Affiliates    [employer] [place] [employer] - Occupational Stress Questionnaire     Feeling of Stress : Rather much   Housing Stability: Low Risk  (11/12/2024)    [facility]    Housing Stability Vital Sign     Unable to Pay for Housing in the Last Year: No     Number of Times Moved in the Last Year: 0     Homeless in the Last Year: No   [2]   Current Outpatient Medications:     amitriptyline (ELAVIL) 75 MG tablet, Take 75 mg by mouth., Disp: , Rfl:     amoxicillin (AMOXIL) 875 MG tablet, Take 875 mg by mouth 2 (two) times daily., Disp: , Rfl:     atorvastatin (LIPITOR) 40 MG tablet, Take 1 tablet by mouth every morning., Disp: , Rfl:     ergocalciferol (ERGOCALCIFEROL) 50,000 unit Cap, Take 50,000 Units by mouth every 7 days., Disp: , Rfl:     gabapentin (NEURONTIN) 300 MG capsule, Take by mouth., Disp: , Rfl:     levothyroxine (SYNTHROID) 50 MCG tablet, Take 50 mcg by mouth every morning., Disp: , Rfl:     losartan (COZAAR) 25 MG tablet, Take 25 mg by mouth once daily., Disp: , Rfl:     metroNIDAZOLE 1 % GlwP, SMARTSIG:Topical Every Evening, Disp: , Rfl:     oxyCODONE (ROXICODONE) 30 MG Tab, Take 5 mg by mouth every 6 (six) hours as needed., Disp: , Rfl:     predniSONE (DELTASONE) 20 MG tablet, Take 20 mg by mouth., Disp: , Rfl:     sertraline (ZOLOFT) 100 MG tablet, Take 100 mg by mouth., Disp: , Rfl:

## 2025-07-07 NOTE — PROGRESS NOTES
"  Cancer Genetics  [facility] - Breast Surgery  [facility]          Date of Service:  2025  Provider:  Trina Ferraro [place], [facility] [place]  Collaborating physician: Gisel Koch MD    Patient Information  Name:  Lucila Feliz  :  1960  MRN:  [id number]        Referring Provider: Wendy Peañ MD     Visit type: in-person.   Face-to-face time with patient: approximately 37 minutes.  Approximately 78 minutes in total were spent on this encounter, which includes face-to-face time and non-face-to-face time preparing to see the patient (e.g., review of tests), obtaining and/or reviewing separately obtained history, documenting clinical information in the electronic or other health record, independently interpreting results (not   reported) and communicating results to the patient/family/caregiver, or care coordination (not separately reported).      SUBJECTIVE:      Reason for Referral: At high risk for breast cancer     History of Present Illness (HPI):  Lucila Feliz ("LUCILA"), 65 y.o., assigned female sex at birth is established to the [facility] - Breast Surgery department but new to me.  She was referred by Breast Surgery for genetic cancer risk assessment given she is at high-risk for breast cancer.  She has no personal history of cancer. or evaluation of new self reported findings of left axillary fullness and left nipple inversion. She also reports cyclic bilateral breast pain that has been present for at least 3 months, diffuse. Upon further discussion of left nipple inversion, patient reports this is something she has noticed comes and goes, with some days being very noticeable to her and other days the nipple is not inverted. Recent imaging showed no evidence of disease.    Focused Medical History:   Germline cancer-genetic testing:  No  Cancer:  No  Colonoscopy: Yes  Most recent colonoscopy: 2017, rec'd to repeat in 10 years  Colon polyp:  " "No  Mammogram: Yes  Most recent bilateral mammo: 08/30/2024  Left mammmo w/ u/s biopsy: 05/16/2025  Breast MRI:  No  Other benign tumor/finding:  Yes  Lipoma on chest   Left breast - STROMAL FIBROSIS, CYSTIC DILATATION OF DUCTS, SCLEROSING ADENOSIS, APOCRINE   METAPLASIA AND MILD DUCTAL EPITHELIAL HYPERPLASIA OF THE USUAL TYPE   Pancreatitis:  No  Pancreatic cyst:  No  Reproductive organs intact    Breast-Specific History  Height:  5'8"  Weight:  162 lbs  Breast density per BI-RADS:  heterogeneously dense  Age at menarche:  13 years  Age at first live birth:  17 years  Menopause:  postmenopausal at 48 years  HRT usage:  never  BRCA testing:  No  Personal history of ovarian cancer:  No  Personal history of breast biopsy:  hyperplasia (not atypia)  Ashkenazi Mandaeism inheritance:  No  History of XRT to chest wall:  No  Family history:  as detailed in pedigree/family history    Imaging:   Diagnostic MMG and complete US of L breast performed 4/29/25:  UNILATERAL LEFT DIGITAL DIAGNOSTIC MAMMOGRAM 3D/2D WITH CAD: 4/29/2025  HISTORY: 65-year-old female presents for evaluation of:   - left axillary swelling for 2 months  - left nipple inversion for almost 1 year  - diffuse left breast pain for 1 month  - palpable lump in the left breast upper outer quadrant for 1 month     Family history of breast cancer (sister age 50).       COMPARISONS: Comparison is made to exams dated:  8/30/2024 mammogram, 5/16/2022 mammogram, 11/19/2019 mammogram - [facility], 9/12/2016 mammogram - [facility], 3/19/2014 mammogram, and 10/29/2012 mammogram - [facility].       TECHNIQUE: Digital mammography views were performed of the left breast with tomosynthesis, including spot compression views. Current study was evaluated with a Computer Aided Detection (CAD) system. Complete ultrasound evaluation of the left breast four quadrants and retroareolar region and the left axilla was subsequently " performed, and static and cine grayscale images were submitted to PACS for review.      BREAST COMPOSITION: The left breast is heterogeneously dense, which may obscure small masses.       FINDINGS:      LEFT DIAGNOSTIC MAMMOGRAM     No significant masses, calcifications, or other findings seen in the left breast.       No significant mammographic abnormality is evident in the area of palpable concern localized by the patient to the 2:00 left breast (denoted by a triangular skin marker).     LEFT BREAST ULTRASOUND     Complete ultrasound evaluation was performed of the left breast and axilla, with attention to the areas of clinical concern.     There are multiple dilated ducts in the left breast subareolar region.  Within several of these dilated ducts in the subareolar left breast, there is hypoechoic/isoechoic intraductal debris versus mass, spanning at least 3.5 cm.     No other significant sonographic abnormality is evident in the left breast.  No suspicious sonographic abnormality is evident in the area of palpable concern localized by the patient to the 2:00 left breast 5 cm from the nipple.  Normal-appearing fibroglandular and fibrofatty tissue seen in the area of palpable concern.     Ultrasound evaluation of the left axilla demonstrates no significant left axillary adenopathy.  No sonographic correlate is identified for the patient's left axillary swelling.        IMPRESSION: SUSPICIOUS OF MALIGNANCY  1.  Left breast subareolar region dilated ducts with hypoechoic/isoechoic intraductal debris versus mass, spanning at least 3.5 cm, are suspicious for malignancy.    2.  No significant mammographic or sonographic abnormality in the area of palpable concern in the 2:00 left breast 5 cm from the nipple.    3.  No mammographic or sonographic correlate for the patient's diffuse left breast pain or left axillary swelling.     RECOMMENDATIONS:   Ultrasound-guided core needle biopsy of the left breast subareolar  region dilated ducts with hypoechoic intraductal debris versus mass is recommended.     US guided CNB of left subareolar area performed 5/16/25     Pathology:  US guided CNB of L subareolar mass:      FINAL DIAGNOSIS      BREAST, LEFT:      FOCAL DUCT CYST WITH MILD PERICYSTIC CHRONIC INFLAMMATION.      STROMAL FIBROSIS, CYSTIC DILATATION OF DUCTS, SCLEROSING ADENOSIS, APOCRINE   METAPLASIA AND MILD DUCTAL EPITHELIAL HYPERPLASIA OF THE USUAL TYPE.      NO EVIDENCE OF MALIGNANCY.      Radiology note following results of CNB:    Pathology results are benign and concordant with mammography and ultrasound findings.          RECOMMENDATIONS:  Recommend clinical follow-up for the multiple left breast areas of concern for which the patient presented for diagnostic breast imaging on 04/29/2025.     Return to annual screening mammography with tomosynthesis is recommended unless clinical signs or symptoms warrant earlier evaluation. Next routine annual screening mammogram is due September, 2025.     Past Medical History:   Diagnosis Date    Breast pain in female 05/31/2025    Depression     Encounter for blood transfusion     Hyperlipidemia     Hypertension     Neuromuscular disorder     Thyroid disease        Patient Active Problem List    Diagnosis Date Noted    Breast pain in female 05/31/2025        Past Surgical History:   Procedure Laterality Date    CHOLECYSTECTOMY      HIP SURGERY Left     Screws in Left Hip ==SX 2013    JOINT REPLACEMENT Left     Left Hip ==2013    SPINE SURGERY         Ancestry:  Ashkenazi Buddhist ancestry:  No  Paternal:   Maternal:     Focused Family History:  Consanguinity in ancestors:  No  Germline cancer-genetic testing in blood relatives:  No  Cancer in family:  Yes; there are no known blood relatives affected with cancer other than those mentioned in the pedigree below    Family Cancer Pedigree:           Review of Systems:  See HPI.      SUBJECTIVE:   Records  Reviewed  Medical History  Problem List  Any pertinent, available Procedures and Pathology reports in both [facility] and [facility]    COUNSELING   Causes of cancer  Germline cancer genetic testing is the testing of genes associated with cancer, known as cancer susceptibility genes.  Just as these genes are inherited from parents, mutations in these genes can be inherited, as well.  A mutation in a cancer susceptibility gene adversely affects the gene's ability to prevent cancer; therefore, carriers of cancer susceptibility gene mutations may be at increased risk for certain cancers.     Only 5-10% cancers are caused by a cancer susceptibility gene mutation, meaning the cancer is genetic/hereditary; rather, most cancers are sporadic.  Causes of sporadic cancers may include environmental risk factors, lifestyle risk factors, and non-modifiable risk factors.  It is important to note that members of a family often share not only their genetics but also risk factors including environmental and lifestyle risk factors, so cancers can be familial.    Mutations in BRCA1 and BRCA2 are associated with an increased risk for breast and ovarian cancer, in addition to male breast cancer, pancreatic, melanoma, and prostate cancer. Mutations in other genes may increase the risk of breast and prostate cancer, in addition to other cancers.      Potential results of genetic testing, and their implications  Potential results of genetic testing include positive, negative, and variant of unknown significance (VUS).    A positive result indicates the presence of at least one clinically significant mutation, and the patient's associated cancer risks vary depending upon the cancer susceptibility gene(s) in which there is/are a mutation(s).  With a positive result, in some cases, depending upon the specific result and the patient's clinical history, modified risk management may be recommended, including measures for risk  reduction and/or surveillance; however, modified management is not always an option.    A negative result indicates that no clinically significant mutations were identified in the gene(s) tested.    A VUS indicates that there is not presently enough data for the laboratory to make a determination as to whether the variant is clinically significant.  VUSs are not typically acted upon clinically.       The ability to interpret the meaning of a negative genetic testing result in genes associated with cancer with which the patient has not personally been affected, when done prior to testing the appropriate affected relative(s), is significantly limited.  A negative result in the patient does not indicate that she cannot develop cancer, and, in fact, the patient may even be at increased risk for cancer based on shared risk factors with affected relatives. The most informative candidates for initial genetic testing in a family are those who have been affected with cancer.     Modified management may also be recommended, even with a result of no or unknown significance, based upon risk assessment that incorporates the family history.      Lit Elizabethk Score  Breast Cancer Risk Stratification  Current estimated lifetime risk of breast cancer, as calculated utilizing the Tyrer-Cuzick risk-assessment model v8.0b:  6.73%.  This places the patient in the average-risk range according to current clinical guidelines. As such, the NCCN guidelines recommend that she continue annual mammograms - due September 2025.     Genetic mutation inheritance  If  MANDO tests positive for a mutation, her first-degree relatives would each have a 50% chance of having the same mutation, and other, more distantly related blood-relatives would also be at risk of having the same mutation.       Genetic discrimination  The Genetic Information Nondiscrimination Act (FELY) is [place] federal legislation that provides some protections against use of an  individual's genetic information by their health insurer and by their employer.  Title I of FELY prohibits most health insurers from utilizing an individual's genetic information to make decisions regarding insurance eligibility or premium charges.  Title II of FELY prohibits covered entities, including employers, from requesting the genetic information of employees and applicants.  FELY does not protect individuals from genetic discrimination toward health insurance obtained through a job with the  or through the Federal Employees Health Benefits Plan; from genetic discrimination by employers with fewer than 15 employees or if employed by the ; or from genetic discrimination by any other type of policies/entities, including but not limited to life insurance, disability insurance, long-term care insurance,  benefits, and  Health Services benefits.     Genetic testing logistics  An outside laboratory would perform the testing after a blood sample is collected or a saliva sample is collected by the patient at home.  With genetic testing, there is a potential for the patient to incur out-of-pocket costs.  Results can take 2-3 weeks.  Post-test genetic counseling can be conducted once the genetic testing results are available.     Based on the information provided by  MANDO, she meets current criteria for genetic testing of hereditary breast cancer according to current clinical guidelines. MANDO's clinical history, including personal history and/or family history, is strongly suggestive of a hereditary cancer syndrome in the family given the family history of breast cancer in a first-degree relative diagnosed at age 50.     Assessment    ICD-10   At high risk for breast cancer Z91.89    2.   Family history of breast cancer Z80.3     Plan     At high risk for breast cancer  Referral to Breast Surgery     Family history of breast cancer  Offered germline cancer-genetic testing at this time  versus deferring testing at this time or declining testing altogether.  Various test panel options were discussed. Lucila agreed to proceed with genetic testing through the [employer] 70-gene Multi-Cancer Panel (AIP, ALK, APC, YUNIER, AXIN2, BAP1, BARD1, BLM, BMPR1A, BRCA1, BRCA2, BRIP1, CDC73, CDH1, CDK4, CDKN1B, CDKN2A, CHEK2, CTNNA1, DICER1, EGFR, EPCAM, FH, FLCN, GREM1, HOXB13, KIT, LZTR1, MAX, MBD4, MEN1, MET, MITF, MLH1, MSH2, MSH3, MSH6, MUTYH, NF1, NF2, NTHL1, PALB2, PDGFRA, PMS2, POLD1, POLE, POT1, NYVLJ1T, PTCH1, PTEN, RAD51C, RAD51D, RB1, RET, SDHA, SDHAF2, SDHB, SDHC, SDHD, SMAD4, SMARCA4, SMARCB1, SMARCE1, STK11, SUFU, CTSY398, TP53, TSC1, TSC2, VHL).  LUCILA has provided her informed consent to proceed. A blood sample was collected in clinic.     Questions were encouraged and answered to the patient's satisfaction, and she verbalized understanding of information and agreement with the plan.         Signed,    Trina Ferraro MS, [facility] [place]  Licensed - Certified Genetic Counselor  [facility] - Breast Surgery    07/09/2025

## 2025-07-09 ENCOUNTER — OFFICE VISIT (OUTPATIENT)
Dept: SURGERY | Facility: CLINIC | Age: 65
End: 2025-07-09
Payer: MEDICARE

## 2025-07-09 DIAGNOSIS — Z91.89 AT HIGH RISK FOR BREAST CANCER: ICD-10-CM

## 2025-07-09 DIAGNOSIS — Z80.3 FAMILY HISTORY OF BREAST CANCER: Primary | ICD-10-CM

## 2025-07-09 PROCEDURE — 99999 PR PBB SHADOW E&M-EST. PATIENT-LVL I: CPT | Mod: PBBFAC,,,

## 2025-07-09 PROCEDURE — 99499 UNLISTED E&M SERVICE: CPT | Mod: S$GLB,,,

## 2025-07-09 PROCEDURE — 96041 GENETIC COUNSELING SVC EA 30: CPT | Mod: S$GLB,,,

## 2025-07-23 ENCOUNTER — PATIENT MESSAGE (OUTPATIENT)
Dept: SURGERY | Facility: CLINIC | Age: 65
End: 2025-07-23
Payer: MEDICARE